# Patient Record
Sex: FEMALE | Race: WHITE | URBAN - METROPOLITAN AREA
[De-identification: names, ages, dates, MRNs, and addresses within clinical notes are randomized per-mention and may not be internally consistent; named-entity substitution may affect disease eponyms.]

---

## 2021-05-26 ENCOUNTER — RECORDS - HEALTHEAST (OUTPATIENT)
Dept: ADMINISTRATIVE | Facility: CLINIC | Age: 56
End: 2021-05-26

## 2021-06-01 ENCOUNTER — RECORDS - HEALTHEAST (OUTPATIENT)
Dept: ADMINISTRATIVE | Facility: CLINIC | Age: 56
End: 2021-06-01

## 2021-06-02 ENCOUNTER — RECORDS - HEALTHEAST (OUTPATIENT)
Dept: ADMINISTRATIVE | Facility: CLINIC | Age: 56
End: 2021-06-02

## 2021-07-13 ENCOUNTER — RECORDS - HEALTHEAST (OUTPATIENT)
Dept: ADMINISTRATIVE | Facility: CLINIC | Age: 56
End: 2021-07-13

## 2021-07-21 ENCOUNTER — RECORDS - HEALTHEAST (OUTPATIENT)
Dept: ADMINISTRATIVE | Facility: CLINIC | Age: 56
End: 2021-07-21

## 2025-03-04 ENCOUNTER — OFFICE VISIT (OUTPATIENT)
Dept: PLASTIC SURGERY | Facility: CLINIC | Age: 60
End: 2025-03-04

## 2025-03-04 DIAGNOSIS — Z41.1 ENCOUNTER FOR COSMETIC PROCEDURE: Primary | ICD-10-CM

## 2025-03-04 NOTE — PROGRESS NOTES
FUNCTIONAL COMPLAINTS RELATED TO DROOPY EYELIDS/BROWS:  Lauren Rodriguez describes upper lids interfering with superior visual field and interfering with activities of daily living including reading, driving and watching television.      EXAM:      No upper lid ptosis.     Dermatochalasis with excess skin touching the eyelids  Lids resting on eyelashes obstructing the temporal visual axis    Lauren MCLEOD Vikkisuzette has noted visual field constriction from dermatochalasis. The lids contribute to the significant lateral visual field obstruction and when these are elevated her vision improves. Removing the eyelid skin would lead to improvement in her vision. I recommend a bilateral upper blepharoplasty.     Risks and benefits were discussed including but not limited to bleeding, infection, asymmetry, milia, scar, vision issues including blindness, need for additional procedures.       She would like to do this in OR with some sedation.

## 2025-03-04 NOTE — PROGRESS NOTES
Facial Plastic and Reconstructive Surgery Cosmetic Consultation  03/04/25     HPI:   I had the pleasure of seeing Lauren Rodriguez today in clinic for consultation for facial rejuvenation.    Lauren Rodriguez is a 59 year old female. She has a history of a rhinoplasty with Dr. Loo. She is bothered by her periorbital region including upper and lower lids. She has never had surgery on her lids before. She is on a weight loss medication and is noticing more lines around her mouth. She has had filler to her lower lids before about 10 years ago and liked that, did it again last year and didn't feel like it did much.       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   She has deepening of her prejowl sulcus and some perioral lines.     Facial Plastic and Reconstructive Surgery    Procedure: Dermal Filler for Facial Contouring  Indication: Facial volume deficit  Injector: Dr. Jennifer Prieto MD      The risks and benefits associated with dermal filler were discussed. Informed consent was obtained specifically addressing risks including intravascular injection. The skin was cleaned with antimicrobial solution and a topical ice was placed. If the patient elected topical anesthetic solution, this was placed.     A needle was used to establish the entry point of the canula. The canula and needle were used to inject the filler with slow cautious consistent flow with careful attention to the danger zones of the face.      Intermittent ice was used topically throughout the procedure. Hemostasis was obtained at the needle entry site with light digital pressure. The skin was cleaned.    Filler Type: Belotero, Restylane Defyne  Total Amount of Filler: 2cc    Please see procedure log.    There were no complications and the patient  tolerated the procedure well.  Post procedure care instructions were given to the patient.              IMPRESSION/PLAN: Lauren Rodriguez is a 59 year old female here today in consult for facial rejuvenation.     We discussed that I would not recommend tear trough filler. She really needs a lower lid blepharoplasty to help with that but she is not ready to do that yet. We discussed upper lid blepharoplasty, see separate note.     We did some perioral filler today.     Photodocumentation was obtained.

## 2025-03-06 RX ORDER — SERTRALINE HYDROCHLORIDE 25 MG/1
25 TABLET, FILM COATED ORAL
COMMUNITY
Start: 2022-03-03

## 2025-03-06 RX ORDER — SEMAGLUTIDE 2.4 MG/.75ML
INJECTION, SOLUTION SUBCUTANEOUS
COMMUNITY

## 2025-03-06 RX ORDER — ROSUVASTATIN CALCIUM 20 MG/1
1 TABLET, COATED ORAL AT BEDTIME
COMMUNITY
Start: 2022-02-03

## 2025-03-06 NOTE — NURSING NOTE
Photo documentation obtained.    Pt was verbally quoted for perioral filler (Defyne and Belotero). Pt verbalized understanding of financial obligation should she wish to move forward with filler.     Informed consent was obtained for dermal filler.     Will work to obtain PA for upper bleph.     Hallie Irvin RN  03/06/25 3:20 PM

## 2025-03-10 ENCOUNTER — TELEPHONE (OUTPATIENT)
Dept: OPHTHALMOLOGY | Facility: CLINIC | Age: 60
End: 2025-03-10

## 2025-03-10 NOTE — TELEPHONE ENCOUNTER
LVM for patient again regarding scheduling a TECH ONLY Appointment for: Marin GARZA for upper bleph for Dr. Prieto patient. Provided direct number for scheduling options.     (Not morning on 3/21/25)